# Patient Record
(demographics unavailable — no encounter records)

---

## 2024-11-09 NOTE — PHYSICAL EXAM
[No Acute Distress] : no acute distress [Well Nourished] : well nourished [Well Developed] : well developed [Well-Appearing] : well-appearing [Supple] : supple [No Respiratory Distress] : no respiratory distress  [No Accessory Muscle Use] : no accessory muscle use [Clear to Auscultation] : lungs were clear to auscultation bilaterally [Normal Rate] : normal rate  [Regular Rhythm] : with a regular rhythm [Normal S1, S2] : normal S1 and S2 [No Murmur] : no murmur heard [No Joint Swelling] : no joint swelling [No Rash] : no rash [Normal Gait] : normal gait [Normal Affect] : the affect was normal [de-identified] : prominent anterior sternocleidomastoid (right > left) but also mildly prominent thyroid [de-identified] : trace-1+ pitting edema (wears compression stockings)

## 2024-11-09 NOTE — ASSESSMENT
Medication(s) Requested: tramadol  Last office visit: 5/22/19  Last refill: 8/28  Is the patient due for refill of this medication(s): Yes  PDMP review: Criteria met. Forwarded to Physician/FERNANDA for signature.     
Patient requesting a refill of Tramadol 50 MG  Quantity of 100    Please send this to the [facility] in [place]      
Prescription e prescribed and patient notified      
[FreeTextEntry1] : 47 yo female with h/o as above here to address a few issues. 1.  Gyn - rx mammo given as due 2.  GI - due for colonoscopy, referral placed and will place direct access colonoscopy referral 3.  ENT/Neck - prominent anterior neck muscles as well as slightly prominent thyroid, rx thyroid US given 4.  GI - elevated bilirubin last 2 labs (with lower transaminases), repeat hepatic panel 5. HCM - consider flu shot (declining today) and covid booster 6.  RTO for cpe when due
[FreeTextEntry1] : 49 yo female with h/o as above here to address a few issues. 1.  Gyn - rx mammo given as due 2.  GI - due for colonoscopy, referral placed and will place direct access colonoscopy referral 3.  ENT/Neck - prominent anterior neck muscles as well as slightly prominent thyroid, rx thyroid US given 4.  GI - elevated bilirubin last 2 labs (with lower transaminases), repeat hepatic panel 5. HCM - consider flu shot (declining today) and covid booster 6.  RTO for cpe when due
[FreeTextEntry1] : 49 yo female with h/o as above here to address a few issues. 1.  Gyn - rx mammo given as due 2.  GI - due for colonoscopy, referral placed and will place direct access colonoscopy referral 3.  ENT/Neck - prominent anterior neck muscles as well as slightly prominent thyroid, rx thyroid US given 4.  GI - elevated bilirubin last 2 labs (with lower transaminases), repeat hepatic panel 5. HCM - consider flu shot (declining today) and covid booster 6.  RTO for cpe when due

## 2024-11-09 NOTE — PHYSICAL EXAM
[No Acute Distress] : no acute distress [Well Nourished] : well nourished [Well Developed] : well developed [Well-Appearing] : well-appearing [Supple] : supple [No Respiratory Distress] : no respiratory distress  [No Accessory Muscle Use] : no accessory muscle use [Clear to Auscultation] : lungs were clear to auscultation bilaterally [Normal Rate] : normal rate  [Regular Rhythm] : with a regular rhythm [Normal S1, S2] : normal S1 and S2 [No Murmur] : no murmur heard [No Joint Swelling] : no joint swelling [No Rash] : no rash [Normal Gait] : normal gait [Normal Affect] : the affect was normal [de-identified] : prominent anterior sternocleidomastoid (right > left) but also mildly prominent thyroid [de-identified] : trace-1+ pitting edema (wears compression stockings)

## 2024-11-09 NOTE — HISTORY OF PRESENT ILLNESS
[FreeTextEntry1] : f/up visit [de-identified] : 47 yo female with h/o as below here to address a few issues. Arm issues from last visit resolved.  Did well with PT. Wanted to repeat labs from last visit. Needs rx mammo. Also needs rx for colonoscopy. No other active issues.

## 2024-11-09 NOTE — PHYSICAL EXAM
[No Acute Distress] : no acute distress [Well Nourished] : well nourished [Well Developed] : well developed [Well-Appearing] : well-appearing [Supple] : supple [No Respiratory Distress] : no respiratory distress  [No Accessory Muscle Use] : no accessory muscle use [Clear to Auscultation] : lungs were clear to auscultation bilaterally [Normal Rate] : normal rate  [Regular Rhythm] : with a regular rhythm [Normal S1, S2] : normal S1 and S2 [No Murmur] : no murmur heard [No Joint Swelling] : no joint swelling [No Rash] : no rash [Normal Gait] : normal gait [Normal Affect] : the affect was normal [de-identified] : prominent anterior sternocleidomastoid (right > left) but also mildly prominent thyroid [de-identified] : trace-1+ pitting edema (wears compression stockings)

## 2024-11-09 NOTE — HISTORY OF PRESENT ILLNESS
[FreeTextEntry1] : f/up visit [de-identified] : 49 yo female with h/o as below here to address a few issues. Arm issues from last visit resolved.  Did well with PT. Wanted to repeat labs from last visit. Needs rx mammo. Also needs rx for colonoscopy. No other active issues.

## 2024-11-09 NOTE — HISTORY OF PRESENT ILLNESS
[FreeTextEntry1] : f/up visit [de-identified] : 49 yo female with h/o as below here to address a few issues. Arm issues from last visit resolved.  Did well with PT. Wanted to repeat labs from last visit. Needs rx mammo. Also needs rx for colonoscopy. No other active issues.

## 2025-04-24 NOTE — PHYSICAL EXAM
[Alert] : alert [Healthy Appearing] : healthy appearing [Sclera] : the sclera and conjunctiva were normal [Hearing Threshold Finger Rub Not [place]] : hearing was normal [Normal Appearance] : the appearance of the neck was normal [No Respiratory Distress] : no respiratory distress [Auscultation Breath Sounds / Voice Sounds] : lungs were clear to auscultation bilaterally [Heart Rate And Rhythm] : heart rate was normal and rhythm regular [Normal S1, S2] : normal S1 and S2 [None] : no edema [Bowel Sounds] : normal bowel sounds [Abdomen Tenderness] : non-tender [Abdomen Soft] : soft

## 2025-04-24 NOTE — PHYSICAL EXAM
[Alert] : alert [Healthy Appearing] : healthy appearing [Sclera] : the sclera and conjunctiva were normal [Hearing Threshold Finger Rub Not [place]] : hearing was normal [Normal Appearance] : the appearance of the neck was normal [No Respiratory Distress] : no respiratory distress [Auscultation Breath Sounds / Voice Sounds] : lungs were clear to auscultation bilaterally [Heart Rate And Rhythm] : heart rate was normal and rhythm regular [Normal S1, S2] : normal S1 and S2 [None] : no edema [Bowel Sounds] : normal bowel sounds [Abdomen Tenderness] : non-tender [Abdomen Soft] : soft

## 2025-04-25 NOTE — REVIEW OF SYSTEMS
[As Noted in HPI] : as noted in HPI [Fever] : no fever [Sore Throat] : no sore throat [Chest Pain] : no chest pain [SOB on Exertion] : no shortness of breath during exertion [Rash] : no rash [Skin Wound] : no skin wound [Fainting] : no fainting [Anxiety] : no anxiety [Muscle Weakness] : no muscle weakness

## 2025-04-25 NOTE — HISTORY OF PRESENT ILLNESS
[FreeTextEntry1] : 50 yo female with h/o constipation s/p colonoscopy in 2014 normal . Patient reports normal bms, no brbpr no melena.no abdominal pain, no n/v. no gerd. no weight loss.  no family h/o colon or gastric cancer

## 2025-04-25 NOTE — ASSESSMENT
[FreeTextEntry1] : average risk for colon cancer, recommend colonoscopy for screening, pt prefers alternative cologuard, understands it is a less accurate test for detection of small colon polyps  plan cologuard ordered